# Patient Record
Sex: FEMALE | NOT HISPANIC OR LATINO | URBAN - NONMETROPOLITAN AREA
[De-identification: names, ages, dates, MRNs, and addresses within clinical notes are randomized per-mention and may not be internally consistent; named-entity substitution may affect disease eponyms.]

---
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
## 2025-01-23 NOTE — PATIENT DISCUSSION
Considering Cataract Surgery Counseling: I have discussed the option of scheduling surgery versus following, as well as the risks, benefits and alternatives of cataract surgery with the patient. It was explained that the surgery is elective, there is no rush and there is no harm in waiting to have surgery. It was also explained that there is no guarantee that removing the cataract will improve their vision. The patient understands and desires to follow for now and will consider his/her options. Subjective:      History was provided by the parents.    Krista Sapp is a 6 year old female who is brought in for this well-child visit.    Immunization History   Administered Date(s) Administered    DTaP 11/03/2017    DTaP/Hep B/IPV 2016, 2016, 2016    DTaP/IPV 05/06/2021    Hep B, adolescent or pediatric 2016    Hepatitis A - Adult 03/16/2017, 11/03/2017    Hib (PRP-OMP) 2016, 2016, 07/07/2017    Influenza, split virus, quadrivalent, PF 02/25/2019    Influenza, unspecified formulation 2016, 11/03/2017    MMR 03/16/2017    Measles Mumps Rubella Varicella 05/06/2021    Pneumococcal conjugate PCV 13 2016, 2016, 2016, 07/07/2017    Rotavirus - monovalent 2016, 2016    Varicella 03/16/2017     Krista has Well child visit and Nocturnal enuresis on their problem list.  Krista has No Known Allergies.    Current Issues:  Current concerns include has started having bedwetting at night only.  Is a very deep sound sleeper and had been nighttime train for short period of time.  No history of constipation  No history of daytime issues  No history of any back pain  No history of any gait abnormalities injuries.  No issues with hematuria dysuria.  No history of polydipsia or polyuria..  No history of any chronic vaginal irritation.  Currently menstruating? no  Does patient snore? no     Review of Nutrition:  Current diet: Good  Balanced diet? no    Social Screening:  Sibling relations:  Good  Discipline concerns? no  Concerns regarding behavior with peers? no  School performance: doing well; no concerns  Secondhand smoke exposure? no    Screening Questions:  Risk factors for anemia: no  Risk factors for tuberculosis: no  Risk factors for dyslipidemia: no     Objective:     There were no vitals filed for this visit.  Growth parameters are noted and are appropriate for age.    General:   alert and cooperative   Gait:   normal   Skin:   normal, warm  and dry, with normal turgor   Oral cavity:   lips, mucosa, and tongue normal; teeth and gums normal   Eyes:   sclerae white, pupils equal and reactive, red reflex normal bilaterally   Ears:   normal bilaterally   Neck:   no adenopathy, no carotid bruit, no JVD, supple, symmetrical, trachea midline, and thyroid not enlarged, symmetric, no tenderness/mass/nodules   Lungs:  clear to auscultation bilaterally   Heart:   regular rate and rhythm, S1, S2 normal, no murmur, click, rub or gallop   Abdomen:  Soft, non-tender; bowel sounds normal; no masses, no hepatosplenomegaly and no rigidity no guarding no rebound.  Could not elicit any point specific pain and/or tenderness.  No masses no significant palpable stool.  No CVA tenderness.   :  exam deferred   Al stage:  Deferred   Extremities:  extremities normal, atraumatic, no cyanosis or edema and gait normal muscle tone normal   Neuro:  normal without focal findings, mental status, speech normal, alert and oriented x3, JERAD, muscle tone and strength normal and symmetric, reflexes normal and symmetric, gait and station normal, Babinski response negative, and spine exam demonstrated no scoliosis no emily pits.  No loss of lumbar lordosis.     Assessment:     Healthy 6 year old female child.  Nocturnal enuresis-unclear if this is still a primary nocturnal enuresis or a secondary episode.  She had only experienced a short amount of time of nighttime control.  Currently, no other worrisome features such as symptoms of diabetes, spinal cord abnormality or chronic constipation.  Urinalysis was obtained which was returned normal.  Culture was also performed for completeness which was returned only positive for 10,000 gram-positive yadira consistent with a skin contaminant.  Family has purchased an alarm device which they we will try.  Continue to monitor for any evidence of constipation  Consistent voiding patterns every 3 hours during the day and especially prior to  sleep.  If no improvement or worsening may also consider DDAVP.     Plan:     1. Anticipatory guidance discussed.  Gave handout on well-child issues at this age.    2.  Weight management:  The patient was counseled regarding nutrition and physical activity.    3. Development: appropriate for age    4. Immunizations today: per orders.  History of previous adverse reactions to immunizations? no    5. Follow-up visit in 1 year for next well child visit, or sooner as needed.  Sequential follow-up in regards to nocturnal enuresis.    Total additional time of 12 minutes was spent discussing the following more complex problems/issues during a routine scheduled well visit:  Nocturnal enuresis  Further documentation, counseling, ordering of labs, imaging or scheduling follow-up with consultants, outside medical history was required beyond the scope of a typical well visit exam.    Ermias Goff MD, FAAP